# Patient Record
Sex: FEMALE | URBAN - METROPOLITAN AREA
[De-identification: names, ages, dates, MRNs, and addresses within clinical notes are randomized per-mention and may not be internally consistent; named-entity substitution may affect disease eponyms.]

---

## 2020-01-14 ENCOUNTER — APPOINTMENT (RX ONLY)
Dept: URBAN - METROPOLITAN AREA CLINIC 73 | Facility: CLINIC | Age: 60
Setting detail: DERMATOLOGY
End: 2020-01-14

## 2020-01-14 DIAGNOSIS — L30.9 DERMATITIS, UNSPECIFIED: ICD-10-CM

## 2020-01-14 DIAGNOSIS — L71.8 OTHER ROSACEA: ICD-10-CM

## 2020-01-14 DIAGNOSIS — L72.8 OTHER FOLLICULAR CYSTS OF THE SKIN AND SUBCUTANEOUS TISSUE: ICD-10-CM

## 2020-01-14 PROBLEM — D48.5 NEOPLASM OF UNCERTAIN BEHAVIOR OF SKIN: Status: ACTIVE | Noted: 2020-01-14

## 2020-01-14 PROCEDURE — ? TREATMENT REGIMEN

## 2020-01-14 PROCEDURE — ? ACNE SURGERY

## 2020-01-14 PROCEDURE — ? PRESCRIPTION

## 2020-01-14 PROCEDURE — ? COUNSELING

## 2020-01-14 PROCEDURE — 10040 EXTRACTION: CPT

## 2020-01-14 PROCEDURE — ? INTRALESIONAL KENALOG

## 2020-01-14 PROCEDURE — 99203 OFFICE O/P NEW LOW 30 MIN: CPT | Mod: 25

## 2020-01-14 PROCEDURE — 11900 INJECT SKIN LESIONS </W 7: CPT

## 2020-01-14 RX ORDER — BRIMONIDINE TARTRATE 5 MG/G
GEL TOPICAL QD
Qty: 1 | Refills: 2 | Status: ERX | COMMUNITY
Start: 2020-01-14

## 2020-01-14 RX ADMIN — BRIMONIDINE TARTRATE: 5 GEL TOPICAL at 00:00

## 2020-01-14 ASSESSMENT — LOCATION ZONE DERM
LOCATION ZONE: FACE
LOCATION ZONE: TRUNK

## 2020-01-14 ASSESSMENT — PAIN INTENSITY VAS: HOW INTENSE IS YOUR PAIN 0 BEING NO PAIN, 10 BEING THE MOST SEVERE PAIN POSSIBLE?: NO PAIN

## 2020-01-14 ASSESSMENT — LOCATION DETAILED DESCRIPTION DERM
LOCATION DETAILED: UPPER STERNUM
LOCATION DETAILED: LEFT INFERIOR CENTRAL MALAR CHEEK
LOCATION DETAILED: RIGHT CENTRAL MALAR CHEEK

## 2020-01-14 ASSESSMENT — SEVERITY ASSESSMENT: SEVERITY: MILD TO MODERATE

## 2020-01-14 ASSESSMENT — LOCATION SIMPLE DESCRIPTION DERM
LOCATION SIMPLE: RIGHT CHEEK
LOCATION SIMPLE: LEFT CHEEK
LOCATION SIMPLE: CHEST

## 2020-01-14 ASSESSMENT — SEVERITY ASSESSMENT OVERALL AMONG ALL PATIENTS
IN YOUR EXPERIENCE, AMONG ALL PATIENTS YOU HAVE SEEN WITH THIS CONDITION, HOW SEVERE IS THIS PATIENT'S CONDITION?: MILD TO MODERATE

## 2020-01-14 NOTE — PROCEDURE: TREATMENT REGIMEN
Continue Regimen: Can use Altreno lotion (previously prescribed) to entire face QOHS as tolerated mix with moisturizer
Initiate Treatment: Oracea 40mg Take one tablet po QD\\nMirvaso gel Apply to face QAM
Discontinue Regimen: Doxycycline 100mg
Detail Level: Zone
Samples Given: Clindamycin pads use to affected areas on chest QD
Otc Regimen: PanOxyl wash use to affected areas on chest QD

## 2020-01-14 NOTE — HPI: PIMPLES (ACNE)
How Severe Is Your Acne?: moderate
Is This A New Presentation, Or A Follow-Up?: Acne
What Type Of Note Output Would You Prefer (Optional)?: Standard Output
Additional Comments (Use Complete Sentences): Patient reports has been experiencing mild acne across face for years but more specifically has a skin lesion on her right cheek that she thinks is a pimple. Patient reports lesion has been there for months and was treated by previous dermatologist back in ~07-08/19 with extraction but reports didn’t get all of it so recommended pt hold off on treatment and trial Altreno lotion. Patient reports he had also recommended laser but pt reports she lost trust and is wanting a second opinion today. Patient also treating Rosacea with doxycycline 100mg daily for years.

## 2020-01-14 NOTE — PROCEDURE: COUNSELING
Detail Level: Zone
Patient Specific Counseling (Will Not Stick From Patient To Patient): - Reports recurring Rosacea on face for years; been treating with doxy 100mg daily for years continuously; will d/c\\n- Start Oracea 40mg Take one tablet po QD and Mirvaso gel Apply to face QAM for redness across face; R/b/sed. \\n- Discussed with pt can use Altreno lotion (previously prescribed) to entire face QOHS as tolerated mix with moisturizer if irritating/drying \\n-sun/trigger avoidance and skin care\\n- Continue sun/trigger avoidance; spf 30+/sun protex daily
Detail Level: Detailed
Patient Specific Counseling (Will Not Stick From Patient To Patient): - Derm unspecified: possible Acneiform vs. Eczematous vs. Grovers on chest; pt reports recurring on and off for years. Patient reports TAC cream helps with itch but not rash. Not resolved with doxy either. Pt declining bx today as pt will try topicals (Clindamycin pads and otc PanOxyl wash) first before considering bx \\n- Discussed with pt if not improved/resolved at next ov will consider bx at that time (not done in past)

## 2020-01-14 NOTE — PROCEDURE: INTRALESIONAL KENALOG
[facility] for Occlusion Of Left Femoropopliteal Bypass Graft, Subsequent Encounter, Occlusion Of Left Femoropopliteal Bypass Graft    No ER follow up scheduled     Bharti Samano/ANGELICA    
Total Volume Injected (Ccs- Only Use Numbers And Decimals): 0.05
Detail Level: Detailed
Administered By (Optional): berlin sierra
X Size Of Lesion In Cm (Optional): 0
Medical Necessity Clause: This procedure was medically necessary because the lesions that were treated were:\\na/s did not help/not successful
Treatment Number (Optional): 1
Kenalog Preparation: Kenalog
Include Z78.9 (Other Specified Conditions Influencing Health Status) As An Associated Diagnosis?: No
Concentration Of Solution Injected (Mg/Ml): 2.0
Consent: The risks of atrophy were reviewed with the patient.

## 2020-01-14 NOTE — PROCEDURE: ACNE SURGERY
Acne Type: Cysts
Render Number Of Lesions Treated: yes
Detail Level: Detailed
Post-Care Instructions: I reviewed with the patient in detail post-care instructions. Patient is to keep the treatment areaas dry overnight, and then apply bacitracin twice daily until healed. Patient may apply hydrogen peroxide soaks to remove any crusting.
Hemostasis: Aluminum Chloride
Prep Text (Optional): Prior to removal the treatment areas were prepped in the usual fashion.
Consent was obtained and risks were reviewed including but not limited to scarring, infection, bleeding, scabbing, incomplete removal, and allergy to anesthesia.
Extraction Method: 30 gauge needle and comedo extractor

## 2020-01-14 NOTE — HPI: RASH
What Type Of Note Output Would You Prefer (Optional)?: Standard Output
How Severe Is Your Rash?: moderate
Is This A New Presentation, Or A Follow-Up?: Rash
Additional History: Patient reports has had red itchy rash across her chest that flares on and off for years. Patient reports has been treating flares with TAC cream every day prescribed by previous dermatologist. Patient reports TAC cream does help with itching when flaring but does not control rash or prevent it from flaring. Patient wanting a second opinion today.

## 2020-01-20 ENCOUNTER — RX ONLY (OUTPATIENT)
Age: 60
Setting detail: RX ONLY
End: 2020-01-20

## 2020-01-20 RX ORDER — CLINDAMYCIN PHOSPHATE 10 MG/ML
SOLUTION TOPICAL QD
Qty: 1 | Refills: 1 | Status: ERX | COMMUNITY
Start: 2020-01-20

## 2020-02-19 ENCOUNTER — APPOINTMENT (RX ONLY)
Dept: URBAN - METROPOLITAN AREA CLINIC 73 | Facility: CLINIC | Age: 60
Setting detail: DERMATOLOGY
End: 2020-02-19

## 2020-02-19 DIAGNOSIS — L72.8 OTHER FOLLICULAR CYSTS OF THE SKIN AND SUBCUTANEOUS TISSUE: ICD-10-CM

## 2020-02-19 DIAGNOSIS — L71.8 OTHER ROSACEA: ICD-10-CM | Status: STABLE

## 2020-02-19 DIAGNOSIS — L30.9 DERMATITIS, UNSPECIFIED: ICD-10-CM

## 2020-02-19 PROBLEM — D48.5 NEOPLASM OF UNCERTAIN BEHAVIOR OF SKIN: Status: ACTIVE | Noted: 2020-02-19

## 2020-02-19 PROCEDURE — ? TREATMENT REGIMEN

## 2020-02-19 PROCEDURE — 99213 OFFICE O/P EST LOW 20 MIN: CPT

## 2020-02-19 PROCEDURE — ? DEFER

## 2020-02-19 PROCEDURE — ? COUNSELING

## 2020-02-19 ASSESSMENT — LOCATION SIMPLE DESCRIPTION DERM
LOCATION SIMPLE: RIGHT CHEEK
LOCATION SIMPLE: LEFT CHEEK
LOCATION SIMPLE: ABDOMEN

## 2020-02-19 ASSESSMENT — LOCATION DETAILED DESCRIPTION DERM
LOCATION DETAILED: SUBXIPHOID
LOCATION DETAILED: RIGHT INFERIOR CENTRAL MALAR CHEEK
LOCATION DETAILED: LEFT INFERIOR CENTRAL MALAR CHEEK

## 2020-02-19 ASSESSMENT — LOCATION ZONE DERM
LOCATION ZONE: TRUNK
LOCATION ZONE: FACE

## 2020-02-19 NOTE — PROCEDURE: COUNSELING
Detail Level: Zone
Patient Specific Counseling (Will Not Stick From Patient To Patient): - pt is here for rosacea f/u. Pt reports she is currently taking oracea + mirvaso + Altreno. (pt had been on DCN 100mg daily for years prior)\\n-good maintenance and no se's on oracea *1mo so far. Pt requested generic of oracea to be sent. \\n- Discussed pt can cont with topicals mirvaso for redness or erythema and can consider laser prn. Discussed possible mirvaso creating rebound redness as per photos (occurs in afternoons/evenings). Discussed pt can use mirvaso BID as well. If not improvement consider elective laser, but otherwise cont with topicals and skin care\\n- pt will call when needs refills of oracea (and will either request generic or branded name) and mirvaso. Samples of oracea given to pt today. \\n- dry and sensitive skin care discussed; spf 30+/sun protex\\n- picture on right cheek taken today - milium/cyst\\nRTC 3-4 months
Detail Level: Simple
Patient Specific Counseling (Will Not Stick From Patient To Patient): - Derm unspecified: possible Acneiform vs. Eczematous vs. Grovers on chest; pt reports recurring on and off for years. Patient reports TAC cream helped prior with itch but not rash. Not resolved with doxy either. Pt declining bx today as pt will try topicals (Clindamycin pads and cordran lotion Sample) first before considering bx \\n- Discussed with pt if not improved/resolved at next ov will consider bx at that time (not done in past)\\n- pt reports rash is not better or worse with clindamycin pads. Pt d/c Panoxyl due to not improvement\\n- discussed pt can cont clindamycin pads BID. \\n- pt asked for natural remedies: recommended: witch hazel/avoidance of sweating and picking, Mediterranean diet, turmeric drops prn\\n- pt declined picture today\\n- dry and sensitive skin care discussed; spf 30+/sun protex\\nRTC 3-4 months
Patient Specific Counseling (Will Not Stick From Patient To Patient): - stable or sl improved from a/s and ILK last visit\\n-d/w pt can consider again monitoring vs. excision. Acne surgery + IlK performed at last visit\\n- d/c picking. Pt can use warm compresses prn\\n-consider plastics/excision prn - pt will review\\n- dry and sensitive skin care discussed; spf 30+/sun protex
Detail Level: Detailed

## 2020-02-19 NOTE — PROCEDURE: TREATMENT REGIMEN
Samples Given: Oracea
Detail Level: Zone
Continue Regimen: Oracea 40mg Take one tablet po QD\\nMirvaso gel Apply to face QAM\\n\\nCan use Altreno lotion (previously prescribed) to entire face QOHS as tolerated mix with moisturizer
Samples Given: Cordran lotion
Continue Regimen: Clindamycin pads BID

## 2020-02-19 NOTE — PROCEDURE: DEFER
Introduction Text (Please End With A Colon): :)
Procedure To Be Performed At Next Visit: Intralesional Kenalog
Detail Level: Detailed

## 2020-10-15 ENCOUNTER — APPOINTMENT (RX ONLY)
Dept: URBAN - METROPOLITAN AREA CLINIC 74 | Facility: CLINIC | Age: 60
Setting detail: DERMATOLOGY
End: 2020-10-15

## 2020-10-15 DIAGNOSIS — L71.8 OTHER ROSACEA: ICD-10-CM

## 2020-10-15 DIAGNOSIS — L663 OTHER SPECIFIED DISEASES OF HAIR AND HAIR FOLLICLES: ICD-10-CM | Status: WELL CONTROLLED

## 2020-10-15 DIAGNOSIS — L738 OTHER SPECIFIED DISEASES OF HAIR AND HAIR FOLLICLES: ICD-10-CM | Status: WELL CONTROLLED

## 2020-10-15 DIAGNOSIS — L73.9 FOLLICULAR DISORDER, UNSPECIFIED: ICD-10-CM | Status: WELL CONTROLLED

## 2020-10-15 PROBLEM — L30.9 DERMATITIS, UNSPECIFIED: Status: ACTIVE | Noted: 2020-10-15

## 2020-10-15 PROCEDURE — ? PRESCRIPTION

## 2020-10-15 PROCEDURE — ? TREATMENT REGIMEN

## 2020-10-15 PROCEDURE — 99213 OFFICE O/P EST LOW 20 MIN: CPT | Mod: 95

## 2020-10-15 PROCEDURE — ? COUNSELING

## 2020-10-15 RX ORDER — CLINDAMYCIN PHOSPHATE 10 MG/ML
SOLUTION TOPICAL
Qty: 1 | Refills: 4 | Status: ERX

## 2020-10-15 RX ORDER — DOXYCYCLINE 100 MG/1
TABLET, FILM COATED ORAL
Qty: 30 | Refills: 1 | Status: CANCELLED | COMMUNITY
Start: 2020-10-15

## 2020-10-15 RX ADMIN — DOXYCYCLINE: 100 TABLET, FILM COATED ORAL at 00:00

## 2020-10-15 ASSESSMENT — LOCATION DETAILED DESCRIPTION DERM: LOCATION DETAILED: SUBXIPHOID

## 2020-10-15 ASSESSMENT — LOCATION ZONE DERM: LOCATION ZONE: TRUNK

## 2020-10-15 ASSESSMENT — LOCATION SIMPLE DESCRIPTION DERM: LOCATION SIMPLE: ABDOMEN

## 2020-10-15 NOTE — PROCEDURE: TREATMENT REGIMEN
Detail Level: Zone
Continue Regimen: Clindamycin pads BID
Initiate Treatment: DCN 100mg take one tablet po qd with food x 2 months
Plan: -pt reports flared on subantimicrobial dosing of DCN\\n-prev derm was dr pleitez and pt had DCN 100mg daily Rx by him from past so used it - worked better as per pt\\n-restart DCN 100mg po daily *2mo; then reconsider titration down to DCN 50mg daily as trial and can go down to subantimicrobial dosing prn (winter time may also provide some rosacea relief)\\n-pt will call us prn when goes down to DCN 50mg daily for Rx\\n-cont topicals\\n-cont sun/trigger avoidance and skin care\\nrtc 3-4mo

## 2020-10-15 NOTE — PROCEDURE: COUNSELING
Detail Level: Simple
Patient Specific Counseling (Will Not Stick From Patient To Patient): - pt happy with Clindamycin pads\\n- will send refills.
Patient Specific Counseling (Will Not Stick From Patient To Patient): Disc with pt to slowly wean off DCN\\nWill send DCN 100mg QD x 2 months then decrease to DCN 50mg daily x 1 month \\nPt will call 1-2 weeks before completing DCN 100mg to get the DCN 50mg dose.
Detail Level: Zone

## 2020-10-20 ENCOUNTER — RX ONLY (OUTPATIENT)
Age: 60
Setting detail: RX ONLY
End: 2020-10-20

## 2020-10-20 RX ORDER — DOXYCYCLINE HYCLATE 100 MG/1
TABLET, COATED ORAL
Qty: 30 | Refills: 1 | Status: ERX